# Patient Record
Sex: MALE | Race: WHITE | NOT HISPANIC OR LATINO | Employment: FULL TIME | URBAN - METROPOLITAN AREA
[De-identification: names, ages, dates, MRNs, and addresses within clinical notes are randomized per-mention and may not be internally consistent; named-entity substitution may affect disease eponyms.]

---

## 2016-10-18 LAB — CRC RECOMMENDATION EXT: NORMAL

## 2021-04-20 PROBLEM — M19.071 OSTEOARTHRITIS OF RIGHT ANKLE AND FOOT: Status: ACTIVE | Noted: 2019-08-06

## 2021-04-20 PROBLEM — M14.671 CHARCOT'S JOINT OF RIGHT FOOT: Status: ACTIVE | Noted: 2019-08-06

## 2022-09-21 ENCOUNTER — PATIENT OUTREACH (OUTPATIENT)
Dept: ADMINISTRATIVE | Facility: HOSPITAL | Age: 67
End: 2022-09-21

## 2022-09-21 NOTE — PROGRESS NOTES
Population Health Outreach.Records Received, hyper-linked into chart at this time. The following record(s)  below were uploaded for Health Maintenance .    10/18/2016-COLONOSCOPY

## 2022-10-27 PROBLEM — M20.41 HAMMER TOE OF RIGHT FOOT: Status: ACTIVE | Noted: 2022-10-27

## 2022-10-27 PROBLEM — L03.039: Status: ACTIVE | Noted: 2022-10-27

## 2023-08-10 PROBLEM — M70.22 OLECRANON BURSITIS OF LEFT ELBOW: Status: ACTIVE | Noted: 2023-08-10
